# Patient Record
Sex: FEMALE | Race: WHITE | NOT HISPANIC OR LATINO | Employment: OTHER | URBAN - METROPOLITAN AREA
[De-identification: names, ages, dates, MRNs, and addresses within clinical notes are randomized per-mention and may not be internally consistent; named-entity substitution may affect disease eponyms.]

---

## 2024-08-09 ENCOUNTER — APPOINTMENT (OUTPATIENT)
Dept: CARDIOLOGY | Facility: HOSPITAL | Age: 89
End: 2024-08-09

## 2024-08-09 ENCOUNTER — APPOINTMENT (OUTPATIENT)
Dept: RADIOLOGY | Facility: HOSPITAL | Age: 89
End: 2024-08-09

## 2024-08-09 ENCOUNTER — HOSPITAL ENCOUNTER (INPATIENT)
Facility: HOSPITAL | Age: 89
LOS: 1 days | Discharge: HOME | End: 2024-08-10
Attending: STUDENT IN AN ORGANIZED HEALTH CARE EDUCATION/TRAINING PROGRAM | Admitting: NURSE PRACTITIONER

## 2024-08-09 DIAGNOSIS — R07.9 CHEST PAIN, UNSPECIFIED TYPE: Primary | ICD-10-CM

## 2024-08-09 DIAGNOSIS — R79.89 ELEVATED TROPONIN: ICD-10-CM

## 2024-08-09 LAB
ALBUMIN SERPL BCP-MCNC: 3.7 G/DL (ref 3.4–5)
ALP SERPL-CCNC: 266 U/L (ref 33–136)
ALT SERPL W P-5'-P-CCNC: 128 U/L (ref 7–45)
ANION GAP SERPL CALC-SCNC: 12 MMOL/L (ref 10–20)
APPEARANCE UR: CLEAR
AST SERPL W P-5'-P-CCNC: 237 U/L (ref 9–39)
BACTERIA #/AREA URNS AUTO: ABNORMAL /HPF
BASOPHILS # BLD AUTO: 0.02 X10*3/UL (ref 0–0.1)
BASOPHILS NFR BLD AUTO: 0.4 %
BILIRUB SERPL-MCNC: 0.6 MG/DL (ref 0–1.2)
BILIRUB UR STRIP.AUTO-MCNC: NEGATIVE MG/DL
BNP SERPL-MCNC: 83 PG/ML (ref 0–99)
BUN SERPL-MCNC: 27 MG/DL (ref 6–23)
CALCIUM SERPL-MCNC: 9.2 MG/DL (ref 8.6–10.3)
CARDIAC TROPONIN I PNL SERPL HS: 32 NG/L (ref 0–13)
CARDIAC TROPONIN I PNL SERPL HS: 34 NG/L (ref 0–13)
CHLORIDE SERPL-SCNC: 104 MMOL/L (ref 98–107)
CO2 SERPL-SCNC: 29 MMOL/L (ref 21–32)
COLOR UR: ABNORMAL
CREAT SERPL-MCNC: 0.68 MG/DL (ref 0.5–1.05)
D DIMER PPP FEU-MCNC: 409 NG/ML FEU
EGFRCR SERPLBLD CKD-EPI 2021: 82 ML/MIN/1.73M*2
EOSINOPHIL # BLD AUTO: 0.05 X10*3/UL (ref 0–0.4)
EOSINOPHIL NFR BLD AUTO: 1 %
ERYTHROCYTE [DISTWIDTH] IN BLOOD BY AUTOMATED COUNT: 13 % (ref 11.5–14.5)
GLUCOSE SERPL-MCNC: 121 MG/DL (ref 74–99)
GLUCOSE UR STRIP.AUTO-MCNC: NORMAL MG/DL
HCT VFR BLD AUTO: 39.9 % (ref 36–46)
HGB BLD-MCNC: 13.4 G/DL (ref 12–16)
IMM GRANULOCYTES # BLD AUTO: 0.02 X10*3/UL (ref 0–0.5)
IMM GRANULOCYTES NFR BLD AUTO: 0.4 % (ref 0–0.9)
KETONES UR STRIP.AUTO-MCNC: NEGATIVE MG/DL
LEUKOCYTE ESTERASE UR QL STRIP.AUTO: ABNORMAL
LYMPHOCYTES # BLD AUTO: 0.9 X10*3/UL (ref 0.8–3)
LYMPHOCYTES NFR BLD AUTO: 17.5 %
MAGNESIUM SERPL-MCNC: 1.71 MG/DL (ref 1.6–2.4)
MCH RBC QN AUTO: 30.9 PG (ref 26–34)
MCHC RBC AUTO-ENTMCNC: 33.6 G/DL (ref 32–36)
MCV RBC AUTO: 92 FL (ref 80–100)
MONOCYTES # BLD AUTO: 0.31 X10*3/UL (ref 0.05–0.8)
MONOCYTES NFR BLD AUTO: 6 %
NEUTROPHILS # BLD AUTO: 3.83 X10*3/UL (ref 1.6–5.5)
NEUTROPHILS NFR BLD AUTO: 74.7 %
NITRITE UR QL STRIP.AUTO: NEGATIVE
NRBC BLD-RTO: 0 /100 WBCS (ref 0–0)
PH UR STRIP.AUTO: 7.5 [PH]
PLATELET # BLD AUTO: 157 X10*3/UL (ref 150–450)
POTASSIUM SERPL-SCNC: 4.3 MMOL/L (ref 3.5–5.3)
PROT SERPL-MCNC: 5.9 G/DL (ref 6.4–8.2)
PROT UR STRIP.AUTO-MCNC: NEGATIVE MG/DL
RBC # BLD AUTO: 4.34 X10*6/UL (ref 4–5.2)
RBC # UR STRIP.AUTO: NEGATIVE /UL
RBC #/AREA URNS AUTO: ABNORMAL /HPF
SODIUM SERPL-SCNC: 141 MMOL/L (ref 136–145)
SP GR UR STRIP.AUTO: 1.01
UROBILINOGEN UR STRIP.AUTO-MCNC: NORMAL MG/DL
WBC # BLD AUTO: 5.1 X10*3/UL (ref 4.4–11.3)
WBC #/AREA URNS AUTO: ABNORMAL /HPF

## 2024-08-09 PROCEDURE — 71045 X-RAY EXAM CHEST 1 VIEW: CPT

## 2024-08-09 PROCEDURE — 84075 ASSAY ALKALINE PHOSPHATASE: CPT | Performed by: STUDENT IN AN ORGANIZED HEALTH CARE EDUCATION/TRAINING PROGRAM

## 2024-08-09 PROCEDURE — 81001 URINALYSIS AUTO W/SCOPE: CPT | Performed by: NURSE PRACTITIONER

## 2024-08-09 PROCEDURE — G0378 HOSPITAL OBSERVATION PER HR: HCPCS

## 2024-08-09 PROCEDURE — 99285 EMERGENCY DEPT VISIT HI MDM: CPT

## 2024-08-09 PROCEDURE — 2060000001 HC INTERMEDIATE ICU ROOM DAILY

## 2024-08-09 PROCEDURE — 85379 FIBRIN DEGRADATION QUANT: CPT | Performed by: STUDENT IN AN ORGANIZED HEALTH CARE EDUCATION/TRAINING PROGRAM

## 2024-08-09 PROCEDURE — 84484 ASSAY OF TROPONIN QUANT: CPT | Performed by: STUDENT IN AN ORGANIZED HEALTH CARE EDUCATION/TRAINING PROGRAM

## 2024-08-09 PROCEDURE — 83735 ASSAY OF MAGNESIUM: CPT | Performed by: STUDENT IN AN ORGANIZED HEALTH CARE EDUCATION/TRAINING PROGRAM

## 2024-08-09 PROCEDURE — 85025 COMPLETE CBC W/AUTO DIFF WBC: CPT | Performed by: STUDENT IN AN ORGANIZED HEALTH CARE EDUCATION/TRAINING PROGRAM

## 2024-08-09 PROCEDURE — 83880 ASSAY OF NATRIURETIC PEPTIDE: CPT | Performed by: NURSE PRACTITIONER

## 2024-08-09 PROCEDURE — 93005 ELECTROCARDIOGRAM TRACING: CPT

## 2024-08-09 PROCEDURE — 96365 THER/PROPH/DIAG IV INF INIT: CPT

## 2024-08-09 PROCEDURE — 36415 COLL VENOUS BLD VENIPUNCTURE: CPT | Performed by: STUDENT IN AN ORGANIZED HEALTH CARE EDUCATION/TRAINING PROGRAM

## 2024-08-09 PROCEDURE — 2500000001 HC RX 250 WO HCPCS SELF ADMINISTERED DRUGS (ALT 637 FOR MEDICARE OP): Performed by: STUDENT IN AN ORGANIZED HEALTH CARE EDUCATION/TRAINING PROGRAM

## 2024-08-09 PROCEDURE — 2500000004 HC RX 250 GENERAL PHARMACY W/ HCPCS (ALT 636 FOR OP/ED): Performed by: NURSE PRACTITIONER

## 2024-08-09 PROCEDURE — 71045 X-RAY EXAM CHEST 1 VIEW: CPT | Performed by: RADIOLOGY

## 2024-08-09 RX ORDER — NAPROXEN SODIUM 220 MG/1
324 TABLET, FILM COATED ORAL ONCE
Status: COMPLETED | OUTPATIENT
Start: 2024-08-09 | End: 2024-08-09

## 2024-08-09 RX ORDER — ACETAMINOPHEN 325 MG/1
650 TABLET ORAL EVERY 4 HOURS PRN
Status: DISCONTINUED | OUTPATIENT
Start: 2024-08-09 | End: 2024-08-10 | Stop reason: HOSPADM

## 2024-08-09 RX ORDER — HEPARIN SODIUM 5000 [USP'U]/ML
5000 INJECTION, SOLUTION INTRAVENOUS; SUBCUTANEOUS EVERY 8 HOURS
Status: DISCONTINUED | OUTPATIENT
Start: 2024-08-09 | End: 2024-08-10

## 2024-08-09 RX ORDER — AMIODARONE HYDROCHLORIDE 200 MG/1
200 TABLET ORAL DAILY
COMMUNITY
End: 2024-08-09 | Stop reason: ENTERED-IN-ERROR

## 2024-08-09 RX ORDER — ASPIRIN 81 MG/1
81 TABLET ORAL DAILY
Status: DISCONTINUED | OUTPATIENT
Start: 2024-08-10 | End: 2024-08-10 | Stop reason: HOSPADM

## 2024-08-09 RX ORDER — CEFTRIAXONE 1 G/50ML
1 INJECTION, SOLUTION INTRAVENOUS EVERY 24 HOURS
Status: DISCONTINUED | OUTPATIENT
Start: 2024-08-09 | End: 2024-08-10 | Stop reason: HOSPADM

## 2024-08-09 RX ADMIN — HEPARIN SODIUM 5000 UNITS: 5000 INJECTION INTRAVENOUS; SUBCUTANEOUS at 23:02

## 2024-08-09 RX ADMIN — ASPIRIN 81 MG CHEWABLE TABLET 324 MG: 81 TABLET CHEWABLE at 21:21

## 2024-08-09 RX ADMIN — CEFTRIAXONE SODIUM 1 G: 1 INJECTION, SOLUTION INTRAVENOUS at 23:02

## 2024-08-09 ASSESSMENT — COLUMBIA-SUICIDE SEVERITY RATING SCALE - C-SSRS
2. HAVE YOU ACTUALLY HAD ANY THOUGHTS OF KILLING YOURSELF?: NO
1. IN THE PAST MONTH, HAVE YOU WISHED YOU WERE DEAD OR WISHED YOU COULD GO TO SLEEP AND NOT WAKE UP?: NO
6. HAVE YOU EVER DONE ANYTHING, STARTED TO DO ANYTHING, OR PREPARED TO DO ANYTHING TO END YOUR LIFE?: NO

## 2024-08-09 ASSESSMENT — PAIN SCALES - GENERAL
PAINLEVEL_OUTOF10: 3
PAINLEVEL_OUTOF10: 3

## 2024-08-09 ASSESSMENT — LIFESTYLE VARIABLES
HAVE PEOPLE ANNOYED YOU BY CRITICIZING YOUR DRINKING: NO
HAVE YOU EVER FELT YOU SHOULD CUT DOWN ON YOUR DRINKING: NO
EVER FELT BAD OR GUILTY ABOUT YOUR DRINKING: NO
TOTAL SCORE: 0
EVER HAD A DRINK FIRST THING IN THE MORNING TO STEADY YOUR NERVES TO GET RID OF A HANGOVER: NO

## 2024-08-09 ASSESSMENT — PAIN - FUNCTIONAL ASSESSMENT: PAIN_FUNCTIONAL_ASSESSMENT: 0-10

## 2024-08-09 ASSESSMENT — PAIN DESCRIPTION - PROGRESSION: CLINICAL_PROGRESSION: GRADUALLY IMPROVING

## 2024-08-09 NOTE — ED PROVIDER NOTES
EMERGENCY DEPARTMENT ENCOUNTER      Pt Name: Mamie Eng  MRN: [id number]  Birthdate 3/25/1932  Date of evaluation: 8/9/2024  Provider: Sourav Ochoa DO    CHIEF COMPLAINT       Chief Complaint   Patient presents with    Chest Pain       HISTORY OF PRESENT ILLNESS    Mamie Eng is a 89 y.o. female who presents to the emergency department with EMS for chest pain which has since resolved without intervention.  Patient states that she was eating her dinner this evening.  Approximately 1 hour after eating she had a dull aching sensation throughout the anterior chest wall.  That occurred for approximately 1 hour and has since resolved.  She is now asymptomatic and feels well.  She had no associated symptoms during the episode.  Denies any history of MI.  She has otherwise been in her usual state of health prior to the episode as well.          Nursing Notes were reviewed.    REVIEW OF SYSTEMS     CONSTITUTIONAL: Denies fever, sweats, chills.   NEURO: Denies difficulty walking, numbness, weakness, tingling, headache.   HEENT: Denies sore throat, rhinorrhea, changes in vision.   CARDIO: Endorses chest pain.  Denies palpitations.  PULM: Denies shortness of breath, cough.   GI: Denies abdominal pain, nausea, vomiting, diarrhea, constipation, melena, hematochezia.  : Denies painful urination, frequency, hematuria.   MSK: Denies recent trauma.   SKIN: Denies rash, lesions.   ENDOCRINE: Denies unexpected weight-loss.   HEME: Denies bleeding disorder.     PAST MEDICAL HISTORY   History reviewed. No pertinent past medical history.  Hypertension, hyperlipidemia  SURGICAL HISTORY     History reviewed. No pertinent surgical history.    ALLERGIES     Patient has no known allergies.    FAMILY HISTORY     No family history on file.     SOCIAL HISTORY       Social History     Socioeconomic History    Marital status:      Social Determinants of Health     Financial Resource Strain: Low Risk  (6/12/2023)    Received from  [facility] [place]    Overall Financial Resource Strain (CARDIA)     Difficulty of Paying Living Expenses: Not hard at all   Food Insecurity: No Food Insecurity (2/11/2024)    Received from [facility] [place]    Hunger Vital Sign     Worried About Running Out of Food in the Last Year: Never true     Ran Out of Food in the Last Year: Never true   Transportation Needs: No Transportation Needs (1/31/2024)    Received from [facility] [place]    PRAPARE - Transportation     Lack of Transportation (Medical): No     Lack of Transportation (Non-Medical): No       PHYSICAL EXAM   VS: As documented in the triage note from today's date and EMR flowsheet were reviewed.  Gen: Well developed. No acute distress. Seated in bed. Appears nontoxic.  Alert and oriented person and place.  Skin: Warm. Dry. Intact. No rashes or lesions.  Eyes: Pupils equally round and reactive to light. Clear sclera.   HENT: Atraumatic appearance. Mucosal membranes moist. No oral lesions, uvula midline, airway patent.   CV: Regular rate and regular rhythm. S1, S2. No pedal edema. Warm extremities.  Resp: Nonlabored breathing Clear to auscultation bilaterally. No increased work of breathing.   GI: Soft and nontender. No rebound or guarding. Bowel sounds x4 present.  Pryor sign McBurney's point tenderness is negative.  MSK: Symmetric muscle bulk. No joint swelling in the extremities. Compartments are soft. Neurovascularly intact x4 extremities. Radial pulses +2 equal bilaterally.  Pedal pulses +2 equal bilaterally.  No reproducible chest wall tenderness.  Neuro: Alert. Speech fluent. Moving all extremities. No focal deficits. Gait normal.  Psych: Appropriate. Kempt.    DIAGNOSTIC RESULTS   RADIOLOGY:   Non-plain film images such as CT, Ultrasound and MRI are read by the radiologist. Plain radiographic images are visualized and preliminarily interpreted by the emergency physician with the below findings: Chest x-ray no widened mediastinum no evidence of  pneumonia.      Interpretation per the Radiologist below, if available at the time of this note:    XR chest 1 view   Final Result   Question mild vascular congestion.        MACRO:   None        Signed by: Baldemar Ramachandran 8/9/2024 8:15 PM   Dictation workstation:   [id number]            ED BEDSIDE ULTRASOUND:   Performed by ED Physician - none    LABS:  Labs Reviewed   COMPREHENSIVE METABOLIC PANEL - Abnormal       Result Value    Glucose 121 (*)     Sodium 141      Potassium 4.3      Chloride 104      Bicarbonate 29      Anion Gap 12      Urea Nitrogen 27 (*)     Creatinine 0.68      eGFR 82      Calcium 9.2      Albumin 3.7      Alkaline Phosphatase 266 (*)     Total Protein 5.9 (*)      (*)     Bilirubin, Total 0.6       (*)    SERIAL TROPONIN-INITIAL - Abnormal    Troponin I, High Sensitivity 34 (*)     Narrative:     Less than 99th percentile of normal range cutoff-  Female and children under 18 years old <14 ng/L; Male <21 ng/L: Negative  Repeat testing should be performed if clinically indicated.     Female and children under 18 years old 14-50 ng/L; Male 21-50 ng/L:  Consistent with possible cardiac damage and possible increased clinical   risk. Serial measurements may help to assess extent of myocardial damage.     >50 ng/L: Consistent with cardiac damage, increased clinical risk and  myocardial infarction. Serial measurements may help assess extent of   myocardial damage.      NOTE: Children less than 1 year old may have higher baseline troponin   levels and results should be interpreted in conjunction with the overall   clinical context.     NOTE: Troponin I testing is performed using a different   testing methodology at [facility] than at other   [facility]. Direct result comparisons should only   be made within the same method.   MAGNESIUM - Normal    Magnesium 1.71     D-DIMER, VTE EXCLUSION - Normal    D-Dimer, Quantitative VTE Exclusion 409      Narrative:     The  VTE Exclusion D-Dimer assay is reported in ng/mL Fibrinogen Equivalent Units (FEU).    Per 's instructions for use, a value of less than 500 ng/mL (FEU) may help to exclude DVT or PE in outpatients when the assay is used with a clinical pretest probability assessment.(AEMR must utilize and document eCalc 'Wells Score Deep Vein Thrombosis Risk' for DVT exclusion only. Emergency Department should utilize  Guidelines for Emergency Department Use of the VTE Exclusion D-Dimer and Clinical Pretest probability assessment model for DVT or PE exclusion.)   CBC WITH AUTO DIFFERENTIAL    WBC 5.1      nRBC 0.0      RBC 4.34      Hemoglobin 13.4      Hematocrit 39.9      MCV 92      MCH 30.9      MCHC 33.6      RDW 13.0      Platelets 157      Neutrophils % 74.7      Immature Granulocytes %, Automated 0.4      Lymphocytes % 17.5      Monocytes % 6.0      Eosinophils % 1.0      Basophils % 0.4      Neutrophils Absolute 3.83      Immature Granulocytes Absolute, Automated 0.02      Lymphocytes Absolute 0.90      Monocytes Absolute 0.31      Eosinophils Absolute 0.05      Basophils Absolute 0.02     TROPONIN SERIES- (INITIAL, 1 HR)    Narrative:     The following orders were created for panel order Troponin I Series, High Sensitivity (0, 1 HR).  Procedure                               Abnormality         Status                     ---------                               -----------         ------                     Troponin I, High Sensiti...[[id number]]  Abnormal            Final result               Troponin, High Sensitivi...[[id number]]                      In process                   Please view results for these tests on the individual orders.   SERIAL TROPONIN, 1 HOUR       All other labs were within normal range or not returned as of this dictation.    EMERGENCY DEPARTMENT COURSE/MDM:   Vitals:    Vitals:    08/09/24 1925 08/09/24 1930 08/09/24 2030 08/09/24 2100   BP: 160/68 147/67 129/60 160/69   Patient  "Position: Lying      Pulse: 70 68 69 74   Resp: 18  20 18   Temp: 36.7 °C (98.1 °F)      TempSrc: Temporal      SpO2: 97% 95% 95% 96%   Weight: 63 kg (139 lb) 63 kg (139 lb)     Height: 2.007 m (6' 7\") 2.007 m (6' 7\")         I reviewed the patient's triage vitals and they are hypertensive recommend follow-up with primary physician for repeat check.    Due to the above findings the following was ordered cardiac workup to include D-dimer.    Workup was pursued shows no evidence of electrolyte derangements renal function is appropriate.  No significant anemia no leukocytosis make infectious etiology less likely.  Chest x-ray shows no acute pathology.  D-dimer within normal range making PE less likely.  Cardiac troponin slightly elevated indeterminate at this time no acute EKG changes.  Has no active chest pain or symptoms.  Elevated heart score patient will need admission to the hospital for further restratification.  Patient does have elevated LFTs no right upper quadrant tenderness she was recommended close outpatient follow-up for further workup.  I did speak with the admitting physician who is agreed to accept the patient he did take over care at time of admission order.      HEART Score:    History:   [ x ] Slightly suspicious - 0   [  ] Moderately suspicious - 1  [  ] Highly suspicious - 2     EKG:   [  ] Normal - 0    [ x ] Non-specific repolarization disturbance (No ST changes, but LBBB, LVH, repolarization changes)  - 1   [  ] Significant ST deviation (ST changes not d/t LBBB, LVH, digoxin)  - 2     Age:   [  ] < 45 - 0   [  ] 45-64 - 1   [ x ] > 65 - 2     Risk Factors:     HTN, HLD, DM, obesity (BMI > 30), smoking (current or w/I 3mo), + fmx (before age of 65), CAD, prior MI, PCI/CABG, CVA/TIA, PAD  [  ] No known risk factors - 0   [  ] 1-2 risk factors - 1    [ x ] >3 risk factors or hx of atherosclerotic disease - 2     Initial troponin:  [  ] < normal limit - 0   [ x ] 1 - 3x normal limit - 1 "   [  ] > 3x normal limit - 2    Total:   [ ] 0-3 Points: 1.7% risk of major adverse cardiac event in 6 weeks  [x] 4-6 Points: 12-16.6% risk of major adverse cardiac event in 6 weeks  [ ] 7-10 Points 50-65% risk of major adverse cardiac event in 6 weeks        ED Course as of 08/09/24 2116   Fri Aug 09, 2024   1928 Interpreted by the Emergency Department EMS EKG attending: ECG revealed normal sinus rhythm first-degree AV block at a rate of 67 beats per minute with NV interval 256 , QRS of 142 , QTc of 462.  No acute injury pattern.  No previous EKGs to review.  Right bundle branch block reportedly chronic. [MG]   1937 Interpreted by the Emergency Department Attending: ECG revealed normal sinus rhythm first-degree AV block at a rate of 68 beats per minute with NV interval 272 , QRS of 140 , QTc of 472.  No acute injury pattern.  No change. [MG]   2109 Interpreted by the Emergency Department Attending: ECG revealed normal sinus rhythm first-degree AV block at a rate of 75 beats per minute with NV interval 256 , QRS of 140 , QTc of 477.  No acute injury pattern.  No significant change [MG]      ED Course User Index  [MG] Sourav Ochoa DO         Diagnoses as of 08/09/24 2116   Chest pain, unspecified type   Elevated troponin       Patient was counseled regarding labs, imaging, likely diagnosis, and plan. All questions were answered.     ------------------------------------------------------------------  Information provided by the patient and EMS  Consults hospitalist  Due to social and economic determinants resides at skilled nursing facility  Past medical history complicating workup hypertension  Previous medical records reviewed office visit 7/15/2024  Considered CTA of the chest although not indicated at this time as patient D-dimer within normal range.  ------------------------------------------------------------------  ED Medications administered this visit:    Medications   aspirin chewable tablet 324 mg  (has no administration in time range)     Final Impression:   1. Chest pain, unspecified type    2. Elevated troponin          Sourav Ochoa DO    (Please note that portions of this note were completed with a voice recognition program.  Efforts were made to edit the dictations but occasionally words are mis-transcribed.)     Sourav Ochoa DO  08/09/24 8003

## 2024-08-09 NOTE — ED TRIAGE NOTES
Pt brought in via ems stating pt started having CP after dinner at her nursing facility([place]). Per ems pt EKG showed a bundle branch which appears not to be new to pt as she do have a history of it.upon arrival, pt is a/o x3. And on RA

## 2024-08-10 ENCOUNTER — APPOINTMENT (OUTPATIENT)
Dept: RADIOLOGY | Facility: HOSPITAL | Age: 89
End: 2024-08-10

## 2024-08-10 ENCOUNTER — APPOINTMENT (OUTPATIENT)
Dept: CARDIOLOGY | Facility: HOSPITAL | Age: 89
End: 2024-08-10

## 2024-08-10 ENCOUNTER — HOSPITAL ENCOUNTER (OUTPATIENT)
Dept: CARDIOLOGY | Facility: HOSPITAL | Age: 89
Discharge: HOME | End: 2024-08-10

## 2024-08-10 VITALS
BODY MASS INDEX: 18.83 KG/M2 | DIASTOLIC BLOOD PRESSURE: 65 MMHG | RESPIRATION RATE: 18 BRPM | TEMPERATURE: 97.2 F | WEIGHT: 139 LBS | SYSTOLIC BLOOD PRESSURE: 150 MMHG | HEIGHT: 72 IN | OXYGEN SATURATION: 90 % | HEART RATE: 70 BPM

## 2024-08-10 DIAGNOSIS — R07.9 CHEST PAIN, UNSPECIFIED: ICD-10-CM

## 2024-08-10 LAB
ANION GAP SERPL CALC-SCNC: 10 MMOL/L (ref 10–20)
AORTIC VALVE MEAN GRADIENT: 3 MMHG
AORTIC VALVE PEAK VELOCITY: 1.16 M/S
APTT PPP: 38 SECONDS (ref 27–38)
AV PEAK GRADIENT: 5.4 MMHG
AVA (PEAK VEL): 2.98 CM2
AVA (VTI): 3.29 CM2
BUN SERPL-MCNC: 24 MG/DL (ref 6–23)
CALCIUM SERPL-MCNC: 9 MG/DL (ref 8.6–10.3)
CARDIAC TROPONIN I PNL SERPL HS: 31 NG/L (ref 0–13)
CHLORIDE SERPL-SCNC: 107 MMOL/L (ref 98–107)
CHOLEST SERPL-MCNC: 97 MG/DL (ref 0–199)
CHOLESTEROL/HDL RATIO: 1.5
CO2 SERPL-SCNC: 31 MMOL/L (ref 21–32)
CREAT SERPL-MCNC: 0.58 MG/DL (ref 0.5–1.05)
EGFRCR SERPLBLD CKD-EPI 2021: 85 ML/MIN/1.73M*2
EJECTION FRACTION APICAL 4 CHAMBER: 45.7
EJECTION FRACTION: 63 %
ERYTHROCYTE [DISTWIDTH] IN BLOOD BY AUTOMATED COUNT: 13.2 % (ref 11.5–14.5)
GLUCOSE SERPL-MCNC: 78 MG/DL (ref 74–99)
HCT VFR BLD AUTO: 38.8 % (ref 36–46)
HDLC SERPL-MCNC: 62.9 MG/DL
HGB BLD-MCNC: 13 G/DL (ref 12–16)
INR PPP: 1.2 (ref 0.9–1.1)
LDLC SERPL CALC-MCNC: 26 MG/DL
LEFT VENTRICLE INTERNAL DIMENSION DIASTOLE: 4.22 CM (ref 3.5–6)
LEFT VENTRICULAR OUTFLOW TRACT DIAMETER: 1.9 CM
MCH RBC QN AUTO: 30.8 PG (ref 26–34)
MCHC RBC AUTO-ENTMCNC: 33.5 G/DL (ref 32–36)
MCV RBC AUTO: 92 FL (ref 80–100)
MITRAL VALVE E/A RATIO: 1.03
NON HDL CHOLESTEROL: 34 MG/DL (ref 0–149)
NRBC BLD-RTO: 0 /100 WBCS (ref 0–0)
PLATELET # BLD AUTO: 148 X10*3/UL (ref 150–450)
POTASSIUM SERPL-SCNC: 3.9 MMOL/L (ref 3.5–5.3)
PROTHROMBIN TIME: 13.4 SECONDS (ref 9.8–12.8)
RBC # BLD AUTO: 4.22 X10*6/UL (ref 4–5.2)
RIGHT VENTRICLE FREE WALL PEAK S': 9.79 CM/S
SODIUM SERPL-SCNC: 144 MMOL/L (ref 136–145)
TRICUSPID ANNULAR PLANE SYSTOLIC EXCURSION: 2.1 CM
TRIGL SERPL-MCNC: 42 MG/DL (ref 0–149)
VLDL: 8 MG/DL (ref 0–40)
WBC # BLD AUTO: 3.9 X10*3/UL (ref 4.4–11.3)

## 2024-08-10 PROCEDURE — G0378 HOSPITAL OBSERVATION PER HR: HCPCS

## 2024-08-10 PROCEDURE — 36415 COLL VENOUS BLD VENIPUNCTURE: CPT | Performed by: NURSE PRACTITIONER

## 2024-08-10 PROCEDURE — 99223 1ST HOSP IP/OBS HIGH 75: CPT | Performed by: NURSE PRACTITIONER

## 2024-08-10 PROCEDURE — 74176 CT ABD & PELVIS W/O CONTRAST: CPT | Performed by: RADIOLOGY

## 2024-08-10 PROCEDURE — 84484 ASSAY OF TROPONIN QUANT: CPT | Performed by: NURSE PRACTITIONER

## 2024-08-10 PROCEDURE — 85610 PROTHROMBIN TIME: CPT | Performed by: NURSE PRACTITIONER

## 2024-08-10 PROCEDURE — 93306 TTE W/DOPPLER COMPLETE: CPT | Performed by: INTERNAL MEDICINE

## 2024-08-10 PROCEDURE — 71250 CT THORAX DX C-: CPT | Performed by: RADIOLOGY

## 2024-08-10 PROCEDURE — 85027 COMPLETE CBC AUTOMATED: CPT | Performed by: NURSE PRACTITIONER

## 2024-08-10 PROCEDURE — 2500000001 HC RX 250 WO HCPCS SELF ADMINISTERED DRUGS (ALT 637 FOR MEDICARE OP): Performed by: NURSE PRACTITIONER

## 2024-08-10 PROCEDURE — 99223 1ST HOSP IP/OBS HIGH 75: CPT | Performed by: INTERNAL MEDICINE

## 2024-08-10 PROCEDURE — 93005 ELECTROCARDIOGRAM TRACING: CPT

## 2024-08-10 PROCEDURE — 80048 BASIC METABOLIC PNL TOTAL CA: CPT | Performed by: NURSE PRACTITIONER

## 2024-08-10 PROCEDURE — 93306 TTE W/DOPPLER COMPLETE: CPT

## 2024-08-10 PROCEDURE — 74176 CT ABD & PELVIS W/O CONTRAST: CPT

## 2024-08-10 PROCEDURE — 93010 ELECTROCARDIOGRAM REPORT: CPT | Performed by: INTERNAL MEDICINE

## 2024-08-10 PROCEDURE — 2500000002 HC RX 250 W HCPCS SELF ADMINISTERED DRUGS (ALT 637 FOR MEDICARE OP, ALT 636 FOR OP/ED): Performed by: NURSE PRACTITIONER

## 2024-08-10 PROCEDURE — 2500000004 HC RX 250 GENERAL PHARMACY W/ HCPCS (ALT 636 FOR OP/ED): Performed by: NURSE PRACTITIONER

## 2024-08-10 PROCEDURE — 80061 LIPID PANEL: CPT | Performed by: NURSE PRACTITIONER

## 2024-08-10 RX ORDER — BUPRENORPHINE 2 MG/1
1 TABLET SUBLINGUAL ONCE
Status: COMPLETED | OUTPATIENT
Start: 2024-08-10 | End: 2024-08-10

## 2024-08-10 RX ORDER — ASPIRIN 325 MG
100 TABLET, DELAYED RELEASE (ENTERIC COATED) ORAL DAILY
COMMUNITY

## 2024-08-10 RX ORDER — CALCIUM CARBONATE/VITAMIN D3 250-3.125
1 TABLET ORAL
COMMUNITY

## 2024-08-10 RX ORDER — CYANOCOBALAMIN (VITAMIN B-12) 500 MCG
1 TABLET ORAL NIGHTLY
Status: DISCONTINUED | OUTPATIENT
Start: 2024-08-10 | End: 2024-08-10 | Stop reason: HOSPADM

## 2024-08-10 RX ORDER — PREGABALIN 75 MG/1
75 CAPSULE ORAL 2 TIMES DAILY
COMMUNITY

## 2024-08-10 RX ORDER — ACETAMINOPHEN 325 MG/1
650 TABLET ORAL 2 TIMES DAILY
COMMUNITY

## 2024-08-10 RX ORDER — TERIPARATIDE 250 UG/ML
20 INJECTION, SOLUTION SUBCUTANEOUS DAILY
COMMUNITY

## 2024-08-10 RX ORDER — LACTULOSE 10 G/15ML
20 SOLUTION ORAL 3 TIMES DAILY PRN
COMMUNITY

## 2024-08-10 RX ORDER — CHOLECALCIFEROL (VITAMIN D3) 25 MCG
2000 TABLET ORAL DAILY
Status: DISCONTINUED | OUTPATIENT
Start: 2024-08-10 | End: 2024-08-10 | Stop reason: HOSPADM

## 2024-08-10 RX ORDER — PANTOPRAZOLE SODIUM 20 MG/1
20 TABLET, DELAYED RELEASE ORAL 2 TIMES DAILY
COMMUNITY

## 2024-08-10 RX ORDER — LANOLIN ALCOHOL/MO/W.PET/CERES
500 CREAM (GRAM) TOPICAL DAILY
COMMUNITY

## 2024-08-10 RX ORDER — SENNOSIDES 8.6 MG/1
1 TABLET ORAL 2 TIMES DAILY
Status: DISCONTINUED | OUTPATIENT
Start: 2024-08-10 | End: 2024-08-10 | Stop reason: HOSPADM

## 2024-08-10 RX ORDER — BUPRENORPHINE AND NALOXONE 4; 1 MG/1; MG/1
1 FILM, SOLUBLE BUCCAL; SUBLINGUAL 2 TIMES DAILY
Status: DISCONTINUED | OUTPATIENT
Start: 2024-08-10 | End: 2024-08-10

## 2024-08-10 RX ORDER — LANOLIN ALCOHOL/MO/W.PET/CERES
25 CREAM (GRAM) TOPICAL DAILY
Status: DISCONTINUED | OUTPATIENT
Start: 2024-08-10 | End: 2024-08-10 | Stop reason: HOSPADM

## 2024-08-10 RX ORDER — FUROSEMIDE 20 MG/1
20 TABLET ORAL DAILY PRN
COMMUNITY

## 2024-08-10 RX ORDER — GLYCERIN/MALTODEXTRIN
30 LIQUID (ML) ORAL 2 TIMES DAILY
COMMUNITY

## 2024-08-10 RX ORDER — BISACODYL 10 MG/1
10 SUPPOSITORY RECTAL DAILY PRN
Status: DISCONTINUED | OUTPATIENT
Start: 2024-08-10 | End: 2024-08-10 | Stop reason: HOSPADM

## 2024-08-10 RX ORDER — BISACODYL 10 MG/1
1 SUPPOSITORY RECTAL DAILY PRN
COMMUNITY

## 2024-08-10 RX ORDER — CHOLECALCIFEROL (VITAMIN D3) 25 MCG
2000 TABLET ORAL DAILY
COMMUNITY

## 2024-08-10 RX ORDER — BIOTIN 5000 MCG
1 TABLET,DISINTEGRATING ORAL NIGHTLY
COMMUNITY

## 2024-08-10 RX ORDER — ATORVASTATIN CALCIUM 40 MG/1
40 TABLET, FILM COATED ORAL NIGHTLY
Status: DISCONTINUED | OUTPATIENT
Start: 2024-08-10 | End: 2024-08-10 | Stop reason: HOSPADM

## 2024-08-10 RX ORDER — LANOLIN ALCOHOL/MO/W.PET/CERES
25 CREAM (GRAM) TOPICAL DAILY
COMMUNITY

## 2024-08-10 RX ORDER — LIDOCAINE 50 MG/G
1 OINTMENT TOPICAL 3 TIMES DAILY
COMMUNITY

## 2024-08-10 RX ORDER — POLYETHYLENE GLYCOL 3350 17 G/17G
17 POWDER, FOR SOLUTION ORAL 2 TIMES DAILY
COMMUNITY

## 2024-08-10 RX ORDER — CIPROFLOXACIN 500 MG/1
250 TABLET ORAL EVERY 12 HOURS SCHEDULED
Status: DISCONTINUED | OUTPATIENT
Start: 2024-08-10 | End: 2024-08-10 | Stop reason: HOSPADM

## 2024-08-10 RX ORDER — SENNOSIDES 8.6 MG/1
1 TABLET ORAL 2 TIMES DAILY
COMMUNITY

## 2024-08-10 RX ORDER — POLYETHYLENE GLYCOL 3350 17 G/17G
17 POWDER, FOR SOLUTION ORAL 2 TIMES DAILY
Status: DISCONTINUED | OUTPATIENT
Start: 2024-08-10 | End: 2024-08-10 | Stop reason: HOSPADM

## 2024-08-10 RX ORDER — ATORVASTATIN CALCIUM 40 MG/1
40 TABLET, FILM COATED ORAL DAILY
COMMUNITY

## 2024-08-10 RX ORDER — PANTOPRAZOLE SODIUM 20 MG/1
20 TABLET, DELAYED RELEASE ORAL 2 TIMES DAILY
Status: DISCONTINUED | OUTPATIENT
Start: 2024-08-10 | End: 2024-08-10 | Stop reason: HOSPADM

## 2024-08-10 RX ORDER — CIPROFLOXACIN 250 MG/1
250 TABLET, FILM COATED ORAL EVERY 12 HOURS SCHEDULED
Qty: 20 TABLET | Refills: 0 | Status: SHIPPED | OUTPATIENT
Start: 2024-08-10 | End: 2024-08-20

## 2024-08-10 RX ORDER — BUPRENORPHINE AND NALOXONE 4; 1 MG/1; MG/1
0.25 FILM, SOLUBLE BUCCAL; SUBLINGUAL 2 TIMES DAILY
COMMUNITY

## 2024-08-10 RX ORDER — PREGABALIN 75 MG/1
75 CAPSULE ORAL 2 TIMES DAILY
Status: DISCONTINUED | OUTPATIENT
Start: 2024-08-10 | End: 2024-08-10 | Stop reason: HOSPADM

## 2024-08-10 RX ADMIN — PREGABALIN 75 MG: 75 CAPSULE ORAL at 08:57

## 2024-08-10 RX ADMIN — SENNOSIDES 8.6 MG: 8.6 TABLET, FILM COATED ORAL at 08:57

## 2024-08-10 RX ADMIN — PANTOPRAZOLE SODIUM 20 MG: 20 TABLET, DELAYED RELEASE ORAL at 08:57

## 2024-08-10 RX ADMIN — BUPRENORPHINE 1 MG: 2 TABLET SUBLINGUAL at 08:57

## 2024-08-10 RX ADMIN — Medication 1 MG: at 01:33

## 2024-08-10 RX ADMIN — POLYETHYLENE GLYCOL 3350 17 G: 17 POWDER, FOR SOLUTION ORAL at 08:57

## 2024-08-10 RX ADMIN — ATORVASTATIN CALCIUM 40 MG: 40 TABLET, FILM COATED ORAL at 01:18

## 2024-08-10 RX ADMIN — PSYLLIUM HUSK 1 PACKET: 3.4 POWDER ORAL at 09:00

## 2024-08-10 RX ADMIN — Medication 25 MG: at 09:07

## 2024-08-10 RX ADMIN — Medication 2000 UNITS: at 08:57

## 2024-08-10 SDOH — ECONOMIC STABILITY: TRANSPORTATION INSECURITY
IN THE PAST 12 MONTHS, HAS LACK OF TRANSPORTATION KEPT YOU FROM MEETINGS, WORK, OR FROM GETTING THINGS NEEDED FOR DAILY LIVING?: NO

## 2024-08-10 SDOH — ECONOMIC STABILITY: HOUSING INSECURITY: AT ANY TIME IN THE PAST 12 MONTHS, WERE YOU HOMELESS OR LIVING IN A SHELTER (INCLUDING NOW)?: NO

## 2024-08-10 SDOH — SOCIAL STABILITY: SOCIAL INSECURITY: ARE YOU OR HAVE YOU BEEN THREATENED OR ABUSED PHYSICALLY, EMOTIONALLY, OR SEXUALLY BY ANYONE?: NO

## 2024-08-10 SDOH — SOCIAL STABILITY: SOCIAL INSECURITY: HAVE YOU HAD THOUGHTS OF HARMING ANYONE ELSE?: NO

## 2024-08-10 SDOH — SOCIAL STABILITY: SOCIAL INSECURITY: DOES ANYONE TRY TO KEEP YOU FROM HAVING/CONTACTING OTHER FRIENDS OR DOING THINGS OUTSIDE YOUR HOME?: NO

## 2024-08-10 SDOH — ECONOMIC STABILITY: TRANSPORTATION INSECURITY
IN THE PAST 12 MONTHS, HAS THE LACK OF TRANSPORTATION KEPT YOU FROM MEDICAL APPOINTMENTS OR FROM GETTING MEDICATIONS?: NO

## 2024-08-10 SDOH — SOCIAL STABILITY: SOCIAL INSECURITY: ABUSE: ADULT

## 2024-08-10 SDOH — ECONOMIC STABILITY: INCOME INSECURITY: IN THE LAST 12 MONTHS, WAS THERE A TIME WHEN YOU WERE NOT ABLE TO PAY THE MORTGAGE OR RENT ON TIME?: NO

## 2024-08-10 SDOH — SOCIAL STABILITY: SOCIAL INSECURITY: DO YOU FEEL ANYONE HAS EXPLOITED OR TAKEN ADVANTAGE OF YOU FINANCIALLY OR OF YOUR PERSONAL PROPERTY?: NO

## 2024-08-10 SDOH — SOCIAL STABILITY: SOCIAL INSECURITY: DO YOU FEEL UNSAFE GOING BACK TO THE PLACE WHERE YOU ARE LIVING?: NO

## 2024-08-10 SDOH — ECONOMIC STABILITY: INCOME INSECURITY: HOW HARD IS IT FOR YOU TO PAY FOR THE VERY BASICS LIKE FOOD, HOUSING, MEDICAL CARE, AND HEATING?: NOT HARD AT ALL

## 2024-08-10 SDOH — SOCIAL STABILITY: SOCIAL INSECURITY: ARE THERE ANY APPARENT SIGNS OF INJURIES/BEHAVIORS THAT COULD BE RELATED TO ABUSE/NEGLECT?: NO

## 2024-08-10 SDOH — SOCIAL STABILITY: SOCIAL INSECURITY: HAS ANYONE EVER THREATENED TO HURT YOUR FAMILY OR YOUR PETS?: NO

## 2024-08-10 SDOH — SOCIAL STABILITY: SOCIAL INSECURITY: WERE YOU ABLE TO COMPLETE ALL THE BEHAVIORAL HEALTH SCREENINGS?: YES

## 2024-08-10 SDOH — SOCIAL STABILITY: SOCIAL INSECURITY: HAVE YOU HAD ANY THOUGHTS OF HARMING ANYONE ELSE?: NO

## 2024-08-10 SDOH — ECONOMIC STABILITY: HOUSING INSECURITY: IN THE PAST 12 MONTHS, HOW MANY TIMES HAVE YOU MOVED WHERE YOU WERE LIVING?: 1

## 2024-08-10 ASSESSMENT — ACTIVITIES OF DAILY LIVING (ADL)
HEARING - LEFT EAR: FUNCTIONAL
JUDGMENT_ADEQUATE_SAFELY_COMPLETE_DAILY_ACTIVITIES: YES
FEEDING YOURSELF: INDEPENDENT
TOILETING: NEEDS ASSISTANCE
ASSISTIVE_DEVICE: WALKER
WALKS IN HOME: NEEDS ASSISTANCE
DRESSING YOURSELF: NEEDS ASSISTANCE
BATHING: NEEDS ASSISTANCE
PATIENT'S MEMORY ADEQUATE TO SAFELY COMPLETE DAILY ACTIVITIES?: YES
ADEQUATE_TO_COMPLETE_ADL: YES
GROOMING: INDEPENDENT
HEARING - RIGHT EAR: FUNCTIONAL

## 2024-08-10 ASSESSMENT — COGNITIVE AND FUNCTIONAL STATUS - GENERAL
DRESSING REGULAR LOWER BODY CLOTHING: A LITTLE
WALKING IN HOSPITAL ROOM: A LITTLE
TOILETING: A LITTLE
CLIMB 3 TO 5 STEPS WITH RAILING: A LITTLE
DRESSING REGULAR UPPER BODY CLOTHING: A LITTLE
HELP NEEDED FOR BATHING: A LITTLE
DRESSING REGULAR LOWER BODY CLOTHING: A LITTLE
MOBILITY SCORE: 19
MOBILITY SCORE: 18
TOILETING: A LITTLE
WALKING IN HOSPITAL ROOM: A LITTLE
STANDING UP FROM CHAIR USING ARMS: A LITTLE
MOVING TO AND FROM BED TO CHAIR: A LITTLE
TURNING FROM BACK TO SIDE WHILE IN FLAT BAD: A LITTLE
MOVING FROM LYING ON BACK TO SITTING ON SIDE OF FLAT BED WITH BEDRAILS: A LITTLE
DRESSING REGULAR UPPER BODY CLOTHING: A LITTLE
STANDING UP FROM CHAIR USING ARMS: A LITTLE
DAILY ACTIVITIY SCORE: 20
PATIENT BASELINE BEDBOUND: NO
HELP NEEDED FOR BATHING: A LITTLE
MOVING TO AND FROM BED TO CHAIR: A LITTLE
DAILY ACTIVITIY SCORE: 20
CLIMB 3 TO 5 STEPS WITH RAILING: A LOT

## 2024-08-10 ASSESSMENT — PAIN - FUNCTIONAL ASSESSMENT
PAIN_FUNCTIONAL_ASSESSMENT: 0-10
PAIN_FUNCTIONAL_ASSESSMENT: 0-10

## 2024-08-10 ASSESSMENT — PATIENT HEALTH QUESTIONNAIRE - PHQ9
1. LITTLE INTEREST OR PLEASURE IN DOING THINGS: NOT AT ALL
SUM OF ALL RESPONSES TO PHQ9 QUESTIONS 1 & 2: 0
2. FEELING DOWN, DEPRESSED OR HOPELESS: NOT AT ALL

## 2024-08-10 ASSESSMENT — PAIN SCALES - GENERAL
PAINLEVEL_OUTOF10: 0 - NO PAIN

## 2024-08-10 ASSESSMENT — LIFESTYLE VARIABLES
HOW OFTEN DO YOU HAVE A DRINK CONTAINING ALCOHOL: NEVER
HOW MANY STANDARD DRINKS CONTAINING ALCOHOL DO YOU HAVE ON A TYPICAL DAY: PATIENT DOES NOT DRINK
AUDIT-C TOTAL SCORE: 0
SKIP TO QUESTIONS 9-10: 1
AUDIT-C TOTAL SCORE: 0
HOW OFTEN DO YOU HAVE 6 OR MORE DRINKS ON ONE OCCASION: NEVER

## 2024-08-10 NOTE — CARE PLAN
"  Problem: Pain - Adult  Goal: Verbalizes/displays adequate comfort level or baseline comfort level  Outcome: Progressing     Problem: Safety - Adult  Goal: Free from fall injury  Outcome: Progressing     Problem: Chronic Conditions and Co-morbidities  Goal: Patient's chronic conditions and co-morbidity symptoms are monitored and maintained or improved  Outcome: Progressing    The patient's goals for the shift include  get some sleep tonight    The clinical goals for the shift include maintain no chest pain throughout end of shift    Patient brought up from ER to room 846, admitted due to CP that resolved on its own. Patient denies any pain, CP, or SOB. Patient is free from complaints. Patient Aox4, forgetful, RA, 1 assist up with walker to ambulate. Patient med rec completed with input from son Sourav due to some things being out of date on the printed paper that was provided from [place]. Patient safety maintained, bed alarm activated.     0100: julia kelly CNP made aware of patient's suboxone order being wrong, Sourav (son) stating they only administer 1/4 of the film to patient (only receiving 1 mg as opposed to the ordered 4 mg). Hospital does not have FILM suboxone and we are unable to order 1/4 SL tablet in [facility]. Pharmacy changed med to 1 mg SL tab of Subutex that patient and son asked be given in the morning around 0900 rather than now at 0100. Discussed with son the reason for the change and he is understanding of that. Also made aware that hospital does not carry Forteo subcutaneous Pen injector or Movantik and that he would need to bring in those medications in for her to receive them.     0315: Patient HR ebrtram down to 36 on telemetry, rhythm appears to be a block patient sleeping in bed at time of event. GUNNER Kelly made aware. EKG being obtained. VSS, 122/58, patient reports free from any symptoms.  2 EKGS obtained,   \"Sinus bradycardia with marked sinus arrythmia with 1st degree AV block, " "right BBB, left anterior fascicular block\"  \"Sinus rhythm with 2nd degree (mobitz I), Right BBB, left anterior fascicular block\"  Results sent to Robin, consult placed for cardiology.     0445: patient urine appears to be slightly bloody, light red in color. Patient states when she urinated last, it was not bloody. Robin made aware, ordering Stat CT A/P.     9640: Patient wheeled down to CAT scan via wheelchair without incident  "

## 2024-08-10 NOTE — CARE PLAN
Problem: Pain - Adult  Goal: Verbalizes/displays adequate comfort level or baseline comfort level  Outcome: Progressing     Problem: Safety - Adult  Goal: Free from fall injury  Outcome: Progressing   The patient's goals for the shift include      The clinical goals for the shift include source of blood in urine

## 2024-08-10 NOTE — DISCHARGE SUMMARY
Discharge Diagnosis  Chest pain, unspecified type    Issues Requiring Follow-Up  Patient fully evaluated 8/10, awake, alert, resting in chair with son at bedside. Patient denies chest pain at this time, no further episodes noted. Patient with significant clinical improvement, patient with hematuria noted, plan to repeat UAC&S in 1 week, monitor hematuria, continue antibiotics, probiotic added. patient medically cleared for discharge today. Patient denies acute difficulties at this time and states that she wishes to return to [facility].   Patient seen by cardiology today -   1.  Chest pain.  Clinically not anginal.  Present for 3 hours.  No diagnostic EKG changes beyond her baseline.  Biomarkers minimally elevated but flat.  Check an echocardiogram.  Last echo 2023 normal LV function.  Always discussed with the patient and her son at the bedside.  Records from the [facility] reviewed.  She does have diffuse atherosclerosis of her aorta.  She is on atorvastatin and aspirin.  Consider outpatient stress testing   2. Paroxysmal atrial fibrillation.  This was during an aspiration pneumonia and June 2023 status post RICKY guided cardioversion.  Continue Eliquis.  3.  Bradycardia.  Bifascicular block with intermittent Mobitz 1 AV block.  Evaluated by EP at the [facility].  Pacemaker insertion indicated.  4.  Hematuria.  Possibly related to urinary tract infection.  Aspirin and Eliquis transiently being held  From a cardiac standpoint she is okay for discharge.  Outpatient follow-up with Dr. Cohn.  Her last regadenoson nuclear stress test was in 2021 and was normal.  She does have diffuse atherosclerosis of her aorta.  Continue aspirin and atorvastatin.  Medications and labs reviewed, continue current and repeat labs in the AM if patient still hospitalized. Patient aware and agreeable to current plan, continue plan as above. Per Care Transitions - Pt is from the [facility] , pt is medically stable to be  discharged back to her A/L.  Provided nursing with the [facility]'s phone number.   Family can transport pt back if they are able, if not pt will need to be set up by staff.  Asked nursing make sure they can accept pt back this evening/weekend. I spent 30 minutes in the professional and overall care of this patient.they can accept pt back this evening/weekend. I spent 30 minutes in the professional and overall care of this patient.        Discharge Meds     Your medication list        START taking these medications        Instructions Last Dose Given Next Dose Due   ciprofloxacin 250 mg tablet  Commonly known as: Cipro      Take 1 tablet (250 mg) by mouth every 12 hours for 10 days.              CONTINUE taking these medications        Instructions Last Dose Given Next Dose Due   acetaminophen 325 mg tablet  Commonly known as: Tylenol           apixaban 2.5 mg tablet  Commonly known as: Eliquis           ascorbic acid 500 mg ER capsule  Commonly known as: Vitamin C           atorvastatin 40 mg tablet  Commonly known as: Lipitor           BIOTENE DRY MOUTH ORAL RINSE MM           buprenorphine-naloxone 4-1 mg per sublingual film  Commonly known as: Suboxone           calcium carbonate-vitamin D3 250 mg-3.125 mcg (125 unit) tablet  Commonly known as: Oyster Shell           cholecalciferol 25 MCG (1000 UT) tablet  Commonly known as: Vitamin D-3           co-enzyme Q-10 50 mg capsule           Dulcolax (bisacodyl) 10 mg suppository  Generic drug: bisacodyl           furosemide 20 mg tablet  Commonly known as: Lasix           lactulose 20 gram/30 mL oral solution           lidocaine 5 % ointment  Commonly known as: Xylocaine           LiquaceL  gram-kcal/30 mL liquid  Generic drug: amino acids-protein hydrolys           melatonin 1 mg tablet,disintegrating           naloxegol oxalate 25 mg  Commonly known as: Movantik           pantoprazole 20 mg EC tablet  Commonly known as: ProtoNix           polyethylene glycol 17  gram packet  Commonly known as: Glycolax, Miralax           pregabalin 75 mg capsule  Commonly known as: Lyrica           psyllium 3.4 gram packet  Commonly known as: Metamucil           pyridoxine 50 mg tablet  Commonly known as: Vitamin B-6           sennosides 8.6 mg tablet  Commonly known as: Senokot           sodium chloride 0.65 % nasal spray  Commonly known as: Ocean           teriparatide injection  Commonly known as: Forteo                     Where to Get Your Medications        These medications were sent to [id number] - [facility], [place] - [address]  [address] [facility] [address]      Phone: [phone]   ciprofloxacin 250 mg tablet         Test Results Pending At Discharge  Pending Labs       No current pending labs.            Hospital Course         Blair Rivera MD   Physician  Internal Medicine     H&P      Addendum     Date of Service: 8/10/2024  4:50 AM     Addendum       Expand All Collapse All    History Of Present Illness  Mamie Eng is a 89 y.o. female presenting to emergency department for evaluation of chest pain.  Patient states that she was eating her dinner this evening when she developed a dull, aching sensation throughout the anterior chest wall.  Patient states that this lasted for approximately 1 hour but has since resolved.  Patient states she is now asymptomatic and feels well with no associated signs or symptoms.  Patient denies any recent illness, nausea, vomiting, diarrhea.  Patient denies shortness of breath.     In ED, glucose 121, BUN 27, alk phos 266, .  Troponin 34 with a repeat of 32.  Chest x-ray completed showing mild vascular congestion per radiology review.  EKG completed showing sinus rhythm with a first-degree AV block, right bundle branch block per ER physician review.  Blood pressure 134/56, heart rate 70, respirations 18, temperature 36.9 °C, SpO2 95% on room air.  Patient with a normal echocardiogram completed on  "2/11/2024.  Consult placed to cardiology.  Urinalysis showing leukocytes and WBCs, started on IV ceftriaxone.  CT chest abdomen pelvis ordered and pending.  Patient admitted to telemetry observation under the care of Dr. Rivera who will continue to follow.  I was asked to do H&P and place initial admission orders.       Past Medical History  PE/DVT, pulmonary nodules, right knee pseudogout, B6 deficiency, dyslipidemia, paroxysmal A-fib, general anxiety disorder, essential tremor, heart failure, hypertension, IBS, renal cyst, varicose veins     Surgical History  Appendectomy, colonoscopy, breast lumpectomy, skin biopsy     Social History  Former smoker quit in 1992, occasional alcohol use, no drug use  Family History  Cancer, EtOH abuse, hyperlipidemia, CAD     Allergies  Alendronate, Amiodarone, Levofloxacin, and Lisinopril     Review of Systems  A 10 point review of systems was completed and negative except what is listed in HPI  Physical Exam  Constitutional:       Appearance: Normal appearance.   Neurological:      Mental Status: She is alert.            Last Recorded Vitals  Blood pressure 122/58, pulse 67, temperature 36 °C (96.8 °F), temperature source Temporal, resp. rate 18, height 2.007 m (6' 7\"), weight 63 kg (139 lb), SpO2 92%.     Relevant Results  XR chest 1 view     Result Date: 8/9/2024  Interpreted By:  Baldemar Ramachandran, STUDY: XR CHEST 1 VIEW;  8/9/2024 7:41 pm   INDICATION: Signs/Symptoms:Chest Pain.   COMPARISON: None.   ACCESSION NUMBER(S): [id number]   ORDERING CLINICIAN: ALMA KNOX   FINDINGS: Heart size borderline enlarged with some potential minor vascular congestion. There appears to be probable scarring in the lung apices. No significant pneumothorax or effusion.        Question mild vascular congestion.   MACRO: None   Signed by: Baldemar Ramachandran 8/9/2024 8:15 PM Dictation workstation:   [id number]        Results for orders placed or performed during the hospital encounter of 08/09/24 " (from the past 24 hour(s))   CBC and Auto Differential   Result Value Ref Range     WBC 5.1 4.4 - 11.3 x10*3/uL     nRBC 0.0 0.0 - 0.0 /100 WBCs     RBC 4.34 4.00 - 5.20 x10*6/uL     Hemoglobin 13.4 12.0 - 16.0 g/dL     Hematocrit 39.9 36.0 - 46.0 %     MCV 92 80 - 100 fL     MCH 30.9 26.0 - 34.0 pg     MCHC 33.6 32.0 - 36.0 g/dL     RDW 13.0 11.5 - 14.5 %     Platelets 157 150 - 450 x10*3/uL     Neutrophils % 74.7 40.0 - 80.0 %     Immature Granulocytes %, Automated 0.4 0.0 - 0.9 %     Lymphocytes % 17.5 13.0 - 44.0 %     Monocytes % 6.0 2.0 - 10.0 %     Eosinophils % 1.0 0.0 - 6.0 %     Basophils % 0.4 0.0 - 2.0 %     Neutrophils Absolute 3.83 1.60 - 5.50 x10*3/uL     Immature Granulocytes Absolute, Automated 0.02 0.00 - 0.50 x10*3/uL     Lymphocytes Absolute 0.90 0.80 - 3.00 x10*3/uL     Monocytes Absolute 0.31 0.05 - 0.80 x10*3/uL     Eosinophils Absolute 0.05 0.00 - 0.40 x10*3/uL     Basophils Absolute 0.02 0.00 - 0.10 x10*3/uL   Comprehensive Metabolic Panel   Result Value Ref Range     Glucose 121 (H) 74 - 99 mg/dL     Sodium 141 136 - 145 mmol/L     Potassium 4.3 3.5 - 5.3 mmol/L     Chloride 104 98 - 107 mmol/L     Bicarbonate 29 21 - 32 mmol/L     Anion Gap 12 10 - 20 mmol/L     Urea Nitrogen 27 (H) 6 - 23 mg/dL     Creatinine 0.68 0.50 - 1.05 mg/dL     eGFR 82 >60 mL/min/1.73m*2     Calcium 9.2 8.6 - 10.3 mg/dL     Albumin 3.7 3.4 - 5.0 g/dL     Alkaline Phosphatase 266 (H) 33 - 136 U/L     Total Protein 5.9 (L) 6.4 - 8.2 g/dL      (H) 9 - 39 U/L     Bilirubin, Total 0.6 0.0 - 1.2 mg/dL      (H) 7 - 45 U/L   Magnesium   Result Value Ref Range     Magnesium 1.71 1.60 - 2.40 mg/dL   D-dimer, VTE Exclusion   Result Value Ref Range     D-Dimer, Quantitative VTE Exclusion 409 <=500 ng/mL FEU   Troponin I, High Sensitivity, Initial   Result Value Ref Range     Troponin I, High Sensitivity 34 (H) 0 - 13 ng/L   B-type natriuretic peptide   Result Value Ref Range     BNP 83 0 - 99 pg/mL   Troponin,  High Sensitivity, 1 Hour   Result Value Ref Range     Troponin I, High Sensitivity 32 (H) 0 - 13 ng/L   Urinalysis with Reflex Microscopic   Result Value Ref Range     Color, Urine Light-Yellow Light-Yellow, Yellow, Dark-Yellow     Appearance, Urine Clear Clear     Specific Gravity, Urine 1.014 1.005 - 1.035     pH, Urine 7.5 5.0, 5.5, 6.0, 6.5, 7.0, 7.5, 8.0     Protein, Urine NEGATIVE NEGATIVE, 10 (TRACE), 20 (TRACE) mg/dL     Glucose, Urine Normal Normal mg/dL     Blood, Urine NEGATIVE NEGATIVE     Ketones, Urine NEGATIVE NEGATIVE mg/dL     Bilirubin, Urine NEGATIVE NEGATIVE     Urobilinogen, Urine Normal Normal mg/dL     Nitrite, Urine NEGATIVE NEGATIVE     Leukocyte Esterase, Urine 75 Aaliyah/µL (A) NEGATIVE   Microscopic Only, Urine   Result Value Ref Range     WBC, Urine 11-20 (A) 1-5, NONE /HPF     RBC, Urine 1-2 NONE, 1-2, 3-5 /HPF     Bacteria, Urine 1+ (A) NONE SEEN /HPF            Assessment/Plan  Mamie is a 89-year-old female patient presenting to emergency department for evaluation of chest pain.  Patient states it began when she was eating dinner last evening and describes it as a dull aching sensation throughout her anterior chest wall that lasted approximately 1 hour and resolved on its own.  Patient denies any associated symptoms and currently denies any pain.  Patient had an EKG in ED completed showing sinus rhythm with a first-degree AV block, right bundle branch block per ER physician review.  Patient has a history of Wenckebach.  Patient given aspirin in ED, is already on Eliquis, consult to cardiology.  Troponin 34 with a repeat of 32.  Third troponin pending.  Patient found to have elevated liver enzymes, CT chest abdomen pelvis ordered and pending.  Urinalysis showing leukocytes and WBCs, medicated with IV ceftriaxone.  Vital signs stable, admitted for further medical management.     Chest pain/elevated troponin  DNR CCA  Admit to telemetry observation per Dr. Rivera  See imaging results  above  Continue to trend troponin  Aspirin 324 p.o. in ED  Continue 81 mg baby aspirin daily  Continue home statin daily  CT chest abdomen pelvis ordered and pending  Consult cardiology and appreciate input  Abnormal EKG/history of Wenckebach  Cardiac diet  Oxygen as needed  Intake and output  Lipid/coag/a.m. labs     Elevated liver enzymes  CT chest abdomen pelvis ordered and pending     Acute cystitis  Urine culture pending  Continue IV ceftriaxone     PAF/HERBERT/tremor/heart failure/hypertension/IBS/varicose veins/renal cyst/dyslipidemia/B6 deficiency/pulmonary nodules  #Chronic conditions  Cardiac diet  Continue home medications  Telemetry monitoring  Normal echocardiogram on 2/11/2024     DVT Ppx  SCDs  Continue home Eliquis  Out of bed with assistance  PT/OT           I spent 60 minutes in the professional and overall care of this patient.  Patient fully evaluated and plan as above               Revision History         Pertinent Physical Exam At Time of Discharge  Physical Exam  Patient fully evaluated 8/10, awake, alert, resting in chair with son at bedside. Patient denies chest pain at this time, no further episodes noted. Patient with significant clinical improvement, patient with hematuria noted, plan to repeat UAC&S in 1 week, monitor hematuria, continue antibiotics, probiotic added. patient medically cleared for discharge today. Patient denies acute difficulties at this time and states that she wishes to return to [facility]/.   Patient seen by cardiology today -   1.  Chest pain.  Clinically not anginal.  Present for 3 hours.  No diagnostic EKG changes beyond her baseline.  Biomarkers minimally elevated but flat.  Check an echocardiogram.  Last echo 2023 normal LV function.  Always discussed with the patient and her son at the bedside.  Records from the [facility] reviewed.  She does have diffuse atherosclerosis of her aorta.  She is on atorvastatin and aspirin.  Consider outpatient stress testing    2. Paroxysmal atrial fibrillation.  This was during an aspiration pneumonia and June 2023 status post RICKY guided cardioversion.  Continue Eliquis.  3.  Bradycardia.  Bifascicular block with intermittent Mobitz 1 AV block.  Evaluated by EP at the [facility].  Pacemaker insertion indicated.  4.  Hematuria.  Possibly related to urinary tract infection.  Aspirin and Eliquis transiently being held  From a cardiac standpoint she is okay for discharge.  Outpatient follow-up with Dr. Cohn.  Her last regadenoson nuclear stress test was in 2021 and was normal.  She does have diffuse atherosclerosis of her aorta.  Continue aspirin and atorvastatin.  Medications and labs reviewed, continue current and repeat labs in the AM if patient still hospitalized. Patient aware and agreeable to current plan, continue plan as above. Per Care Transitions - Pt is from the [facility] , pt is medically stable to be discharged back to her A/L.  Provided nursing with the [facility]'s phone number.   Family can transport pt back if they are able, if not pt will need to be set up by staff.  Asked nursing make sure they can accept pt back this evening/weekend. I spent 30 minutes in the professional and overall care of this patient.  Outpatient Follow-Up  No future appointments.      Annia Middleton

## 2024-08-10 NOTE — PROGRESS NOTES
08/10/24 2533   Discharge Planning   Support Systems Family members   Type of Residence Assisted living   Who is requesting discharge planning? Provider   Expected Discharge Disposition Home   Financial Resource Strain   How hard is it for you to pay for the very basics like food, housing, medical care, and heating? Not very     Pt is from the [facility] , pt is medically stable to be discharged back to her A/L.  Provided nursing with the [facility]'s phone number.   Family can transport pt back if they are able, if not pt will need to be set up by staff.  Asked nursing make sure they can accept pt back this evening/weekend.  Gely Overton RN [facility]

## 2024-08-10 NOTE — CONSULTS
Cardiology Consult Note    Inpatient consult to Cardiology  Consult performed by: Kyle Garza MD  Consult ordered by: Meri Kelly, APRN-CNP         Mamie Eng is a 89 y.o. female on day 1 of admission presenting with Chest pain, unspecified type.      Subjective   This is a very pleasant 89-year-old female who is followed by Dr. Marvin Cohn.  The history is obtained from the patient but also her son who is very knowledgeable.  The patient has a history of paroxysmal atrial fibrillation approximately 1 year ago when she was admitted with aspiration pneumonia.  She has been maintained with Eliquis 2.5 twice daily.  She has had a known bifascicular block.  Yesterday after going to happy hour with her , she experienced a midsternal and midepigastric discomfort.  The symptoms lasted approximate 3 hours.  No radiation of symptoms no shortness of breath no exacerbation of symptoms with anything that she identified.  She does take Protonix on a regular basis.  Brought into the hospital.  Symptoms subsequently resolved spontaneously.  No recurrence.  Biomarkers minimally elevated in the 30 range but flat.  EKG revealed a sinus rhythm with bifascicular block (right bundle branch block left anterior fascicular block) with a second-degree type I AV block.  She has been evaluated by electrophysiology in the past for pacemaker and was not felt to be in need of this.  Currently feeling quite well.    ROS:  10 systems reviewed other than what is mentioned above.    Past medical history    1.  Paroxysmal atrial fibrillation status post cardioversion 6/16/2023.  This was in the setting of an aspiration pneumonia.  She is on Eliquis 2.5 twice daily.  2.  Chronic bifascicular block (right bundle branch block left intrafascicular block.  3.  Intermittent Mobitz 1 heart block.  Evaluated in the past with electrophysiology.  4.  Chronic lymphedema for which she uses wraps.  She has had a DVT in the past  5.  Bradycardia  mostly at nighttime  6.  Reflux  7.  Diffuse aortic atherosclerosis noted on a RICKY 6/16/2023    Past Medical History:  History reviewed. No pertinent past medical history.    Problem List Items Addressed This Visit       * (Principal) Chest pain, unspecified type - Primary     Other Visit Diagnoses       Elevated troponin                Family History:  No relevant family history has been documented for this patient.    Medications:  Prior to Admission medications    Medication Sig Start Date End Date Taking? Authorizing Provider   acetaminophen (Tylenol) 325 mg tablet Take 2 tablets (650 mg) by mouth 2 times a day.   Yes Historical Provider, MD   amino acids-protein hydrolys (LiquaceL)  gram-kcal/30 mL liquid Take 30 mL by mouth 2 times a day.   Yes Historical Provider, MD   apixaban (Eliquis) 2.5 mg tablet Take 1 tablet (2.5 mg) by mouth 2 times a day.   Yes Historical Provider, MD   ascorbic acid (Vitamin C) 500 mg ER capsule Take 1 capsule (500 mg) by mouth once daily.   Yes Historical Provider, MD   atorvastatin (Lipitor) 40 mg tablet Take 1 tablet (40 mg) by mouth once daily.   Yes Historical Provider, MD   bisacodyl (Dulcolax, bisacodyl,) 10 mg suppository Insert 1 suppository (10 mg) into the rectum once daily as needed for constipation (constipation).   Yes Historical Provider, MD   buprenorphine-naloxone (Suboxone) 4-1 mg per sublingual film Place 0.25 Film under the tongue 2 times a day. FAMILY ONLY GIVING 1/4 FILM STRIP, BID (1 mg technically BID)   Yes Historical Provider, MD   calcium carbonate-vitamin D3 (Oyster Shell) 250 mg-3.125 mcg (125 unit) tablet Take 1 tablet by mouth 2 times daily (morning and late afternoon).   Yes Historical Provider, MD   cholecalciferol (Vitamin D-3) 25 MCG (1000 UT) tablet Take 2 tablets (2,000 Units) by mouth once daily.   Yes Historical Provider, MD   co-enzyme Q-10 50 mg capsule Take 2 capsules (100 mg) by mouth once daily.   Yes Historical Provider, MD    furosemide (Lasix) 20 mg tablet Take 1 tablet (20 mg) by mouth once daily as needed (for weight gain greater than 3 lbs in 1 day or 5 lbs in 3 days, hold for SBP less than 120).   Yes Historical Provider, MD   lactulose 20 gram/30 mL oral solution Take 30 mL (20 g) by mouth 3 times a day as needed (constipation).   Yes Historical Provider, MD   lidocaine (Xylocaine) 5 % ointment Apply 1 Application topically 3 times a day.   Yes Historical Provider, MD   melatonin 1 mg tablet,disintegrating Take 1 mg by mouth once daily at bedtime.   Yes Historical Provider, MD   naloxegol oxalate (Movantik) 25 mg Take 1 tablet (25 mg) by mouth once daily.   Yes Historical Provider, MD   pantoprazole (ProtoNix) 20 mg EC tablet Take 1 tablet (20 mg) by mouth 2 times a day. Do not crush, chew, or split.   Yes Historical Provider, MD   polyethylene glycol (Glycolax, Miralax) 17 gram packet Take 17 g by mouth 2 times a day.   Yes Historical Provider, MD   pregabalin (Lyrica) 75 mg capsule Take 1 capsule (75 mg) by mouth 2 times a day.   Yes Historical Provider, MD   psyllium (Metamucil) 3.4 gram packet Take 1 packet by mouth 2 times a day.   Yes Historical Provider, MD   pyridoxine (Vitamin B-6) 50 mg tablet Take 0.5 tablets (25 mg) by mouth once daily.   Yes Historical Provider, MD   saliva substitute combo no.9 (BIOTENE DRY MOUTH ORAL RINSE MM) Place 2 sprays into mouth between cheek and gum 4 times a day as needed (dry mouth).   Yes Historical Provider, MD   sennosides (Senokot) 8.6 mg tablet Take 1 tablet (8.6 mg) by mouth 2 times a day.   Yes Historical Provider, MD   sodium chloride (Ocean) 0.65 % nasal spray Administer 2 sprays into each nostril if needed for congestion.   Yes Historical Provider, MD   teriparatide (Forteo) injection Inject 0.08 mL (20 mcg) under the skin once daily.   Yes Historical Provider, MD   amiodarone (Pacerone) 200 mg tablet Take 1 tablet (200 mg) by mouth once daily.  8/9/24  Historical Provider, MD      Current Facility-Administered Medications   Medication Dose Route Frequency Provider Last Rate Last Admin    acetaminophen (Tylenol) tablet 650 mg  650 mg oral q4h PRN Meri FAITH CHAITANYA Kelly        [Held by provider] apixaban (Eliquis) tablet 2.5 mg  2.5 mg oral BID Meri FAITH MICHAEL Kelly-CNP        [Held by provider] aspirin EC tablet 81 mg  81 mg oral Daily Meri FAITH VERONICA KellyN-GUNNER        atorvastatin (Lipitor) tablet 40 mg  40 mg oral Nightly Meri FAITH MICHAEL Kelly-CNP   40 mg at 08/10/24 0118    bisacodyl (Dulcolax) suppository 10 mg  10 mg rectal Daily PRN Meri FAITH MICHAEL Kelly-GUNNER        cefTRIAXone (Rocephin) 1 g in dextrose (iso) IV 50 mL  1 g intravenous q24h Meri FAITH MICHAEL Kelly-CNP   Stopped at 08/09/24 2332    cholecalciferol (Vitamin D-3) tablet 2,000 Units  2,000 Units oral Daily Meri FAITH MICHAEL Kelly-CNP   2,000 Units at 08/10/24 0857    melatonin tablet 1 mg  1 mg oral Nightly Meri FAITH MICHAEL Kelly-CNP   1 mg at 08/10/24 0133    oxygen (O2) therapy   inhalation Continuous PRN - O2/gases Meri FAITH VERONICA KellyN-GUNNER        pantoprazole (ProtoNix) EC tablet 20 mg  20 mg oral BID Meri FAITH MICHAEL Kelly-CNP   20 mg at 08/10/24 0857    polyethylene glycol (Glycolax, Miralax) packet 17 g  17 g oral BID Meri SalinasVERONICA seayN-CNP   17 g at 08/10/24 0857    pregabalin (Lyrica) capsule 75 mg  75 mg oral BID Meri SalinasVERONICA seayN-CNP   75 mg at 08/10/24 0857    psyllium (Metamucil) 3.4 gram packet 1 packet  1 packet oral BID Meri FAITH VERONICA KellyN-CNP        pyridoxine (Vitamin B-6) tablet 25 mg  25 mg oral Daily Meri FAITH VERONICA KellyN-CNP   25 mg at 08/10/24 0907    sennosides (Senokot) tablet 8.6 mg  1 tablet oral BID CHAITANYA Navarro   8.6 mg at 08/10/24 0857    sodium chloride (Ocean) 0.65 % nasal spray 2 spray  2 spray Each Nostril PRN CHAITANYA Navarro           Allergies:  Allergies   Allergen Reactions    Alendronate Myalgia    Amiodarone  Unknown    Levofloxacin Myalgia    Lisinopril Cough     Social History:  Social History     Tobacco Use    Smoking status: Former     Types: Cigarettes    Smokeless tobacco: Never   Substance Use Topics    Alcohol use: Yes     Alcohol/week: 2.0 standard drinks of alcohol     Types: 2 Glasses of wine per week     Physical Exam:  Last Recorded Vitals  /68 (BP Location: Right arm, Patient Position: Lying)   Pulse 65   Temp 36.2 °C (97.2 °F) (Temporal)   Resp 18   Wt 63 kg (139 lb)   SpO2 91%   Intake/Output last 3 Shifts:    Intake/Output Summary (Last 24 hours) at 8/10/2024 0954  Last data filed at 8/10/2024 0000  Gross per 24 hour   Intake 50 ml   Output --   Net 50 ml       Admission Weight  Weight: 63 kg (139 lb) (08/09/24 1925)    Daily Weight  08/09/24 : 63 kg (139 lb)      Patient is alert and oriented x3.  HEENT is unremarkable mucous members are moist  Neck no JVP no bruits upstrokes are full no thyromegaly  Lungs are clear bilaterally.  No wheezing crackles or rales  Heart regular rhythm normal S1-S2 there is no S3 no murmurs are heard.  Abdomen is soft bs are positive nontender nondistended no organomegaly no pulsatile masses  Extremities have no edema.  Distal pulses present palpable.  Neuro is grossly nonfocal  Skin has no rashes     Image Results  CT chest abdomen pelvis wo IV contrast  Narrative: Interpreted By:  Schoenberger, Joseph,   STUDY:  CT CHEST ABDOMEN PELVIS WO CONTRAST;  8/10/2024 5:51 am      INDICATION:  Signs/Symptoms:chest pain/elevated liver enzymes/hx. pulm nodules.      COMPARISON:  None.      ACCESSION NUMBER(S):  [id number]      ORDERING CLINICIAN:  LUIS ENRIQUE NEWBERRY      TECHNIQUE:  CT of the chest, abdomen and pelvis was performed. Contiguous axial  images were obtained at 3 mm slice thickness through the chest,  abdomen and pelvis. Coronal and sagittal reconstructions at 3 mm  slice thickness were performed.  No intravenous contrast was  administered; positive oral  contrast was given.      FINDINGS:  Please note that the study is limited without intravenous contrast.      CHEST:          LUNG/PLEURA/LARGE AIRWAYS:  The large airways are intact. There is no pleural effusion or  pneumothorax or airspace opacity. There are multiple tiny calcified  nodules from prior granulomatous disease. Bandlike opacities in the  lung bases suggest areas of postinflammatory scar. There is minimal  dependent atelectasis the lung bases.      VESSELS:  Aorta and pulmonary arteries are normal caliber.  There are moderate  atherosclerotic calcifications in the thoracic aorta. There are  moderate coronary atherosclerotic calcifications.      HEART:  There is enlargement of the left atrium. There is trace pericardial  fluid      MEDIASTINUM AND SABINO:  Calcified mediastinal and hilar nodes from prior granulomatous  disease are noted. No pathologic enlargement of thoracic lymph nodes.  Normal esophagus      CHEST WALL AND LOWER NECK:  The chest wall soft tissues are normal. No acute findings involving  the bony thorax or definite aggressive osteolytic or osteoblastic  lesion. Thyroid unremarkable. No supraclavicular or axillary  lymphadenopathy.      ABDOMEN:      LIVER:  Multiple punctate calcifications from prior granulomatous disease  otherwise unremarkable.      BILE DUCTS:  No dilation      GALLBLADDER:  Dependent layering gallstones. No wall thickening or pericholecystic  fluid or inflammatory findings.      PANCREAS:  The pancreas appears unremarkable.      SPLEEN:  Multiple punctate granulomata.      ADRENAL GLANDS:  Within normal limits.      KIDNEYS AND URETERS:  Lateral interpolar right renal cyst measuring 3 cm. 2 mm  nonobstructing lower pole right renal stone. 3 mm nonobstructing  interpolar left renal stone. No hydroureteral nephrosis or ureter  stone.      PELVIS:      BLADDER:  Grossly unremarkable urinary bladder.      REPRODUCTIVE ORGANS:  Unremarkable      BOWEL:  CT appearance  stomach is unremarkable. The small bowel loops are  normal in caliber without mural thickening. There is a large amount  retained stool in the colon consistent with constipation. No mural  thickening of the colon. Appendix not seen.      VESSELS:  The aorta and IVC are within normal limits.      PERITONEUM/RETROPERITONEUM/LYMPH NODES:  No ascites or free air, no fluid collection.  The retroperitoneum  appears unremarkable.  No abdominopelvic lymphadenopathy is present.      ABDOMINAL WALL:  Intact      BONES:  No suspicious osseous lesions are identified. There is considerable  volume loss in the L1 vertebral body. The findings likely represent a  remote osteoporotic compression fracture. No acute fracture lines are  identified and there is no compromise the spinal canal.      Impression: Chest  1.  Prior granulomatous disease. No acute findings.      Abdomen-Pelvis  1.  Remote L1 compression fracture.  2. Pericardial limits disease.  3. Constipation.  4. No acute findings          MACRO:  None      Signed by: Joseph Schoenberger 8/10/2024 7:09 AM  Dictation workstation:   [id number]    Relevant Results:  Results for orders placed or performed during the hospital encounter of 08/09/24 (from the past 24 hour(s))   CBC and Auto Differential   Result Value Ref Range    WBC 5.1 4.4 - 11.3 x10*3/uL    nRBC 0.0 0.0 - 0.0 /100 WBCs    RBC 4.34 4.00 - 5.20 x10*6/uL    Hemoglobin 13.4 12.0 - 16.0 g/dL    Hematocrit 39.9 36.0 - 46.0 %    MCV 92 80 - 100 fL    MCH 30.9 26.0 - 34.0 pg    MCHC 33.6 32.0 - 36.0 g/dL    RDW 13.0 11.5 - 14.5 %    Platelets 157 150 - 450 x10*3/uL    Neutrophils % 74.7 40.0 - 80.0 %    Immature Granulocytes %, Automated 0.4 0.0 - 0.9 %    Lymphocytes % 17.5 13.0 - 44.0 %    Monocytes % 6.0 2.0 - 10.0 %    Eosinophils % 1.0 0.0 - 6.0 %    Basophils % 0.4 0.0 - 2.0 %    Neutrophils Absolute 3.83 1.60 - 5.50 x10*3/uL    Immature Granulocytes Absolute, Automated 0.02 0.00 - 0.50 x10*3/uL    Lymphocytes  Absolute 0.90 0.80 - 3.00 x10*3/uL    Monocytes Absolute 0.31 0.05 - 0.80 x10*3/uL    Eosinophils Absolute 0.05 0.00 - 0.40 x10*3/uL    Basophils Absolute 0.02 0.00 - 0.10 x10*3/uL   Comprehensive Metabolic Panel   Result Value Ref Range    Glucose 121 (H) 74 - 99 mg/dL    Sodium 141 136 - 145 mmol/L    Potassium 4.3 3.5 - 5.3 mmol/L    Chloride 104 98 - 107 mmol/L    Bicarbonate 29 21 - 32 mmol/L    Anion Gap 12 10 - 20 mmol/L    Urea Nitrogen 27 (H) 6 - 23 mg/dL    Creatinine 0.68 0.50 - 1.05 mg/dL    eGFR 82 >60 mL/min/1.73m*2    Calcium 9.2 8.6 - 10.3 mg/dL    Albumin 3.7 3.4 - 5.0 g/dL    Alkaline Phosphatase 266 (H) 33 - 136 U/L    Total Protein 5.9 (L) 6.4 - 8.2 g/dL     (H) 9 - 39 U/L    Bilirubin, Total 0.6 0.0 - 1.2 mg/dL     (H) 7 - 45 U/L   Magnesium   Result Value Ref Range    Magnesium 1.71 1.60 - 2.40 mg/dL   D-dimer, VTE Exclusion   Result Value Ref Range    D-Dimer, Quantitative VTE Exclusion 409 <=500 ng/mL FEU   Troponin I, High Sensitivity, Initial   Result Value Ref Range    Troponin I, High Sensitivity 34 (H) 0 - 13 ng/L   B-type natriuretic peptide   Result Value Ref Range    BNP 83 0 - 99 pg/mL   Troponin, High Sensitivity, 1 Hour   Result Value Ref Range    Troponin I, High Sensitivity 32 (H) 0 - 13 ng/L   Urinalysis with Reflex Microscopic   Result Value Ref Range    Color, Urine Light-Yellow Light-Yellow, Yellow, Dark-Yellow    Appearance, Urine Clear Clear    Specific Gravity, Urine 1.014 1.005 - 1.035    pH, Urine 7.5 5.0, 5.5, 6.0, 6.5, 7.0, 7.5, 8.0    Protein, Urine NEGATIVE NEGATIVE, 10 (TRACE), 20 (TRACE) mg/dL    Glucose, Urine Normal Normal mg/dL    Blood, Urine NEGATIVE NEGATIVE    Ketones, Urine NEGATIVE NEGATIVE mg/dL    Bilirubin, Urine NEGATIVE NEGATIVE    Urobilinogen, Urine Normal Normal mg/dL    Nitrite, Urine NEGATIVE NEGATIVE    Leukocyte Esterase, Urine 75 Aaliyah/µL (A) NEGATIVE   Microscopic Only, Urine   Result Value Ref Range    WBC, Urine 11-20 (A) 1-5,  NONE /HPF    RBC, Urine 1-2 NONE, 1-2, 3-5 /HPF    Bacteria, Urine 1+ (A) NONE SEEN /HPF   CBC   Result Value Ref Range    WBC 3.9 (L) 4.4 - 11.3 x10*3/uL    nRBC 0.0 0.0 - 0.0 /100 WBCs    RBC 4.22 4.00 - 5.20 x10*6/uL    Hemoglobin 13.0 12.0 - 16.0 g/dL    Hematocrit 38.8 36.0 - 46.0 %    MCV 92 80 - 100 fL    MCH 30.8 26.0 - 34.0 pg    MCHC 33.5 32.0 - 36.0 g/dL    RDW 13.2 11.5 - 14.5 %    Platelets 148 (L) 150 - 450 x10*3/uL   Troponin I, High Sensitivity   Result Value Ref Range    Troponin I, High Sensitivity 31 (H) 0 - 13 ng/L   Basic Metabolic Panel   Result Value Ref Range    Glucose 78 74 - 99 mg/dL    Sodium 144 136 - 145 mmol/L    Potassium 3.9 3.5 - 5.3 mmol/L    Chloride 107 98 - 107 mmol/L    Bicarbonate 31 21 - 32 mmol/L    Anion Gap 10 10 - 20 mmol/L    Urea Nitrogen 24 (H) 6 - 23 mg/dL    Creatinine 0.58 0.50 - 1.05 mg/dL    eGFR 85 >60 mL/min/1.73m*2    Calcium 9.0 8.6 - 10.3 mg/dL   Coagulation Screen   Result Value Ref Range    Protime 13.4 (H) 9.8 - 12.8 seconds    INR 1.2 (H) 0.9 - 1.1    aPTT 38 27 - 38 seconds   Lipid Panel   Result Value Ref Range    Cholesterol 97 0 - 199 mg/dL    HDL-Cholesterol 62.9 mg/dL    Cholesterol/HDL Ratio 1.5     LDL Calculated 26 <=99 mg/dL    VLDL 8 0 - 40 mg/dL    Triglycerides 42 0 - 149 mg/dL    Non HDL Cholesterol 34 0 - 149 mg/dL       Results from last 7 days   Lab Units 08/10/24  0524 08/09/24 2007   PROTEIN TOTAL g/dL  --  5.9*   BILIRUBIN TOTAL mg/dL  --  0.6   ALK PHOS U/L  --  266*   ALT U/L  --  128*   AST U/L  --  237*   GLUCOSE mg/dL 78 121*   TRIGLYCERIDES mg/dL 42  --    HDL mg/dL 62.9  --    BNP pg/mL  --  83       Assessment/Plan    Past medical history    1.  Paroxysmal atrial fibrillation status post cardioversion 6/16/2023.  This was in the setting of an aspiration pneumonia.  She is on Eliquis 2.5 twice daily.  2.  Chronic bifascicular block (right bundle branch block left intrafascicular block.  3.  Intermittent Mobitz 1 heart block.   Evaluated in the past with electrophysiology.  4.  Chronic lymphedema for which she uses wraps.  She has had a DVT in the past  5.  Bradycardia mostly at nighttime  6.  Reflux  7.  Diffuse aortic atherosclerosis noted on a RICKY 6/16/2023    8/10/2024    1.  Chest pain.  Clinically not anginal.  Present for 3 hours.  No diagnostic EKG changes beyond her baseline.  Biomarkers minimally elevated but flat.  Check an echocardiogram.  Last echo 2023 normal LV function.  Always discussed with the patient and her son at the bedside.  Records from the [facility] reviewed.  She does have diffuse atherosclerosis of her aorta.  She is on atorvastatin and aspirin.  Consider outpatient stress testing  2.  Paroxysmal atrial fibrillation.  This was during an aspiration pneumonia and June 2023 status post RICKY guided cardioversion.  Continue Eliquis.  3.  Bradycardia.  Bifascicular block with intermittent Mobitz 1 AV block.  Evaluated by EP at the [facility].  Pacemaker insertion indicated.    4.  Hematuria.  Possibly related to urinary tract infection.  Aspirin and Eliquis transiently being held    From a cardiac standpoint she is okay for discharge.  Outpatient follow-up with Dr. Cohn.  Her last regadenoson nuclear stress test was in 2021 and was normal.  She does have diffuse atherosclerosis of her aorta.  Continue aspirin and atorvastatin.      Kyle Garza MD

## 2024-08-10 NOTE — H&P
History Of Present Illness  Mamie Eng is a 89 y.o. female presenting to emergency department for evaluation of chest pain.  Patient states that she was eating her dinner this evening when she developed a dull, aching sensation throughout the anterior chest wall.  Patient states that this lasted for approximately 1 hour but has since resolved.  Patient states she is now asymptomatic and feels well with no associated signs or symptoms.  Patient denies any recent illness, nausea, vomiting, diarrhea.  Patient denies shortness of breath.    In ED, glucose 121, BUN 27, alk phos 266, .  Troponin 34 with a repeat of 32.  Chest x-ray completed showing mild vascular congestion per radiology review.  EKG completed showing sinus rhythm with a first-degree AV block, right bundle branch block per ER physician review.  Blood pressure 134/56, heart rate 70, respirations 18, temperature 36.9 °C, SpO2 95% on room air.  Patient with a normal echocardiogram completed on 2/11/2024.  Consult placed to cardiology.  Urinalysis showing leukocytes and WBCs, started on IV ceftriaxone.  CT chest abdomen pelvis ordered and pending.  Patient admitted to telemetry observation under the care of Dr. Rivera who will continue to follow.  I was asked to do H&P and place initial admission orders.       Past Medical History  PE/DVT, pulmonary nodules, right knee pseudogout, B6 deficiency, dyslipidemia, paroxysmal A-fib, general anxiety disorder, essential tremor, heart failure, hypertension, IBS, renal cyst, varicose veins    Surgical History  Appendectomy, colonoscopy, breast lumpectomy, skin biopsy     Social History  Former smoker quit in 1992, occasional alcohol use, no drug use  Family History  Cancer, EtOH abuse, hyperlipidemia, CAD     Allergies  Alendronate, Amiodarone, Levofloxacin, and Lisinopril    Review of Systems  A 10 point review of systems was completed and negative except what is listed in HPI  Physical Exam  Constitutional:   "     Appearance: Normal appearance.   Neurological:      Mental Status: She is alert.          Last Recorded Vitals  Blood pressure 122/58, pulse 67, temperature 36 °C (96.8 °F), temperature source Temporal, resp. rate 18, height 2.007 m (6' 7\"), weight 63 kg (139 lb), SpO2 92%.    Relevant Results  XR chest 1 view    Result Date: 8/9/2024  Interpreted By:  Baldemar Ramachandran, STUDY: XR CHEST 1 VIEW;  8/9/2024 7:41 pm   INDICATION: Signs/Symptoms:Chest Pain.   COMPARISON: None.   ACCESSION NUMBER(S): [id number]   ORDERING CLINICIAN: ALMA KNOX   FINDINGS: Heart size borderline enlarged with some potential minor vascular congestion. There appears to be probable scarring in the lung apices. No significant pneumothorax or effusion.       Question mild vascular congestion.   MACRO: None   Signed by: Baldemar Ramachandran 8/9/2024 8:15 PM Dictation workstation:   [id number]  Results for orders placed or performed during the hospital encounter of 08/09/24 (from the past 24 hour(s))   CBC and Auto Differential   Result Value Ref Range    WBC 5.1 4.4 - 11.3 x10*3/uL    nRBC 0.0 0.0 - 0.0 /100 WBCs    RBC 4.34 4.00 - 5.20 x10*6/uL    Hemoglobin 13.4 12.0 - 16.0 g/dL    Hematocrit 39.9 36.0 - 46.0 %    MCV 92 80 - 100 fL    MCH 30.9 26.0 - 34.0 pg    MCHC 33.6 32.0 - 36.0 g/dL    RDW 13.0 11.5 - 14.5 %    Platelets 157 150 - 450 x10*3/uL    Neutrophils % 74.7 40.0 - 80.0 %    Immature Granulocytes %, Automated 0.4 0.0 - 0.9 %    Lymphocytes % 17.5 13.0 - 44.0 %    Monocytes % 6.0 2.0 - 10.0 %    Eosinophils % 1.0 0.0 - 6.0 %    Basophils % 0.4 0.0 - 2.0 %    Neutrophils Absolute 3.83 1.60 - 5.50 x10*3/uL    Immature Granulocytes Absolute, Automated 0.02 0.00 - 0.50 x10*3/uL    Lymphocytes Absolute 0.90 0.80 - 3.00 x10*3/uL    Monocytes Absolute 0.31 0.05 - 0.80 x10*3/uL    Eosinophils Absolute 0.05 0.00 - 0.40 x10*3/uL    Basophils Absolute 0.02 0.00 - 0.10 x10*3/uL   Comprehensive Metabolic Panel   Result Value Ref Range    " Glucose 121 (H) 74 - 99 mg/dL    Sodium 141 136 - 145 mmol/L    Potassium 4.3 3.5 - 5.3 mmol/L    Chloride 104 98 - 107 mmol/L    Bicarbonate 29 21 - 32 mmol/L    Anion Gap 12 10 - 20 mmol/L    Urea Nitrogen 27 (H) 6 - 23 mg/dL    Creatinine 0.68 0.50 - 1.05 mg/dL    eGFR 82 >60 mL/min/1.73m*2    Calcium 9.2 8.6 - 10.3 mg/dL    Albumin 3.7 3.4 - 5.0 g/dL    Alkaline Phosphatase 266 (H) 33 - 136 U/L    Total Protein 5.9 (L) 6.4 - 8.2 g/dL     (H) 9 - 39 U/L    Bilirubin, Total 0.6 0.0 - 1.2 mg/dL     (H) 7 - 45 U/L   Magnesium   Result Value Ref Range    Magnesium 1.71 1.60 - 2.40 mg/dL   D-dimer, VTE Exclusion   Result Value Ref Range    D-Dimer, Quantitative VTE Exclusion 409 <=500 ng/mL FEU   Troponin I, High Sensitivity, Initial   Result Value Ref Range    Troponin I, High Sensitivity 34 (H) 0 - 13 ng/L   B-type natriuretic peptide   Result Value Ref Range    BNP 83 0 - 99 pg/mL   Troponin, High Sensitivity, 1 Hour   Result Value Ref Range    Troponin I, High Sensitivity 32 (H) 0 - 13 ng/L   Urinalysis with Reflex Microscopic   Result Value Ref Range    Color, Urine Light-Yellow Light-Yellow, Yellow, Dark-Yellow    Appearance, Urine Clear Clear    Specific Gravity, Urine 1.014 1.005 - 1.035    pH, Urine 7.5 5.0, 5.5, 6.0, 6.5, 7.0, 7.5, 8.0    Protein, Urine NEGATIVE NEGATIVE, 10 (TRACE), 20 (TRACE) mg/dL    Glucose, Urine Normal Normal mg/dL    Blood, Urine NEGATIVE NEGATIVE    Ketones, Urine NEGATIVE NEGATIVE mg/dL    Bilirubin, Urine NEGATIVE NEGATIVE    Urobilinogen, Urine Normal Normal mg/dL    Nitrite, Urine NEGATIVE NEGATIVE    Leukocyte Esterase, Urine 75 Aaliyah/µL (A) NEGATIVE   Microscopic Only, Urine   Result Value Ref Range    WBC, Urine 11-20 (A) 1-5, NONE /HPF    RBC, Urine 1-2 NONE, 1-2, 3-5 /HPF    Bacteria, Urine 1+ (A) NONE SEEN /HPF       Assessment/Plan   Mamie is a 89-year-old female patient presenting to emergency department for evaluation of chest pain.  Patient states it  began when she was eating dinner last evening and describes it as a dull aching sensation throughout her anterior chest wall that lasted approximately 1 hour and resolved on its own.  Patient denies any associated symptoms and currently denies any pain.  Patient had an EKG in ED completed showing sinus rhythm with a first-degree AV block, right bundle branch block per ER physician review.  Patient has a history of Wenckebach.  Patient given aspirin in ED, is already on Eliquis, consult to cardiology.  Troponin 34 with a repeat of 32.  Third troponin pending.  Patient found to have elevated liver enzymes, CT chest abdomen pelvis ordered and pending.  Urinalysis showing leukocytes and WBCs, medicated with IV ceftriaxone.  Vital signs stable, admitted for further medical management.    Chest pain/elevated troponin  DNR CCA  Admit to telemetry observation per Dr. Rivera  See imaging results above  Continue to trend troponin  Aspirin 324 p.o. in ED  Continue 81 mg baby aspirin daily  Continue home statin daily  CT chest abdomen pelvis ordered and pending  Consult cardiology and appreciate input  Abnormal EKG/history of Wenckebach  Cardiac diet  Oxygen as needed  Intake and output  Lipid/coag/a.m. labs    Elevated liver enzymes  CT chest abdomen pelvis ordered and pending    Acute cystitis  Urine culture pending  Continue IV ceftriaxone    PAF/HERBERT/tremor/heart failure/hypertension/IBS/varicose veins/renal cyst/dyslipidemia/B6 deficiency/pulmonary nodules  #Chronic conditions  Cardiac diet  Continue home medications  Telemetry monitoring  Normal echocardiogram on 2/11/2024    DVT Ppx  SCDs  Continue home Eliquis  Out of bed with assistance  PT/OT        I spent 60 minutes in the professional and overall care of this patient.  Patient fully evaluated and plan as above    Meri Kelly, MICHAEL-CNP     Yes

## 2024-08-10 NOTE — SIGNIFICANT EVENT
I was notified that this patient developed bradycardia, EKG obtained, noted to have second-degree block.  Vital signs stable, patient without pain or shortness of breath.  Consult placed to cardiology.  Patient also noted to have orange-tinged urine.  No blood noted in urinalysis.  Eliquis and aspirin held at this time, pending a.m. labs.  Patient without pain or discomfort, stable blood pressure.  Nursing advised that if bradycardia occurs to call a rapid response.    MICHAEL Mcconnell, CNP

## 2024-08-14 LAB
ATRIAL RATE: 68 BPM
ATRIAL RATE: 75 BPM
P AXIS: 53 DEGREES
P AXIS: 70 DEGREES
P OFFSET: 149 MS
P ONSET: 86 MS
PR INTERVAL: 256 MS
PR INTERVAL: 272 MS
Q ONSET: 214 MS
Q ONSET: 214 MS
QRS COUNT: 11 BEATS
QRS COUNT: 12 BEATS
QRS DURATION: 140 MS
QRS DURATION: 140 MS
QT INTERVAL: 428 MS
QT INTERVAL: 444 MS
QTC CALCULATION(BAZETT): 472 MS
QTC CALCULATION(BAZETT): 477 MS
QTC FREDERICIA: 460 MS
QTC FREDERICIA: 463 MS
R AXIS: -86 DEGREES
R AXIS: 267 DEGREES
T AXIS: 21 DEGREES
T AXIS: 35 DEGREES
T OFFSET: 428 MS
T OFFSET: 436 MS
VENTRICULAR RATE: 68 BPM
VENTRICULAR RATE: 75 BPM

## 2024-08-27 PROCEDURE — 93005 ELECTROCARDIOGRAM TRACING: CPT

## 2024-08-31 LAB
ATRIAL RATE: 68 BPM
P AXIS: 46 DEGREES
P OFFSET: 86 MS
P ONSET: 16 MS
Q ONSET: 212 MS
QRS COUNT: 8 BEATS
QRS DURATION: 146 MS
QT INTERVAL: 472 MS
QTC CALCULATION(BAZETT): 447 MS
QTC FREDERICIA: 455 MS
R AXIS: -76 DEGREES
T AXIS: -1 DEGREES
T OFFSET: 448 MS
VENTRICULAR RATE: 54 BPM